# Patient Record
Sex: FEMALE | URBAN - METROPOLITAN AREA
[De-identification: names, ages, dates, MRNs, and addresses within clinical notes are randomized per-mention and may not be internally consistent; named-entity substitution may affect disease eponyms.]

---

## 2022-10-14 ENCOUNTER — APPOINTMENT (RX ONLY)
Dept: URBAN - METROPOLITAN AREA CLINIC 43 | Facility: CLINIC | Age: 62
Setting detail: DERMATOLOGY
End: 2022-10-14

## 2022-10-14 DIAGNOSIS — D17 BENIGN LIPOMATOUS NEOPLASM: ICD-10-CM

## 2022-10-14 DIAGNOSIS — L73.8 OTHER SPECIFIED FOLLICULAR DISORDERS: ICD-10-CM

## 2022-10-14 PROBLEM — D17.20 BENIGN LIPOMATOUS NEOPLASM OF SKIN AND SUBCUTANEOUS TISSUE OF UNSPECIFIED LIMB: Status: ACTIVE | Noted: 2022-10-14

## 2022-10-14 PROCEDURE — ? COUNSELING

## 2022-10-14 PROCEDURE — 99202 OFFICE O/P NEW SF 15 MIN: CPT

## 2022-10-14 PROCEDURE — ? ADDITIONAL NOTES

## 2022-10-14 ASSESSMENT — LOCATION ZONE DERM
LOCATION ZONE: LIP
LOCATION ZONE: LEG

## 2022-10-14 ASSESSMENT — LOCATION DETAILED DESCRIPTION DERM
LOCATION DETAILED: RIGHT UPPER CUTANEOUS LIP
LOCATION DETAILED: LEFT POSTERIOR THIGH

## 2022-10-14 ASSESSMENT — LOCATION SIMPLE DESCRIPTION DERM
LOCATION SIMPLE: RIGHT LIP
LOCATION SIMPLE: LEFT INNER THIGH

## 2022-10-14 NOTE — PROCEDURE: ADDITIONAL NOTES
Detail Level: Simple
Additional Notes: Lipoma vs. fibrolipoma\\n-Located on the left inner thigh \\n-Present for ~1 year \\n-Tried salicylic acid and pimple patch \\n- Never drained and no hx of trauma\\n\\n-Discussed etiology of the condition \\n-Reassured benign \\n\\n-Discussed R/B/SE of observation vs excision \\n-Pt opts to monitor \\n\\Mckenna questions answered
Render Risk Assessment In Note?: no
Additional Notes: Discussed nature of condition\\nConsider cosmetic consultation with [facility]\\nConsider microneedling vs. chemical peel\\n\\nDiscussed R/B/SE of punch removal including scarring - rec. Dr. Zhu\\nReferred to [facility] and  for cosmetic procedures

## 2022-12-01 ENCOUNTER — APPOINTMENT (RX ONLY)
Dept: URBAN - METROPOLITAN AREA CLINIC 42 | Facility: CLINIC | Age: 62
Setting detail: DERMATOLOGY
End: 2022-12-01

## 2022-12-01 DIAGNOSIS — L73.8 OTHER SPECIFIED FOLLICULAR DISORDERS: ICD-10-CM

## 2022-12-01 PROCEDURE — ? TCA CROSS

## 2022-12-01 PROCEDURE — ? COUNSELING

## 2022-12-01 PROCEDURE — ? DIAGNOSIS COMMENT

## 2022-12-01 NOTE — PROCEDURE: DIAGNOSIS COMMENT
Render Risk Assessment In Note?: no
Detail Level: Simple
Comment: Discussed the R/B/SE of TCA Cross to one spot on right upper lip\\n2-3 session\\nPaid $150

## 2022-12-01 NOTE — PROCEDURE: TCA CROSS
Post-Care Instructions: I reviewed with the patient in detail post-care instructions. Patient should avoid sun exposure and wear sun protection.
Treatment Number: 1
Prep: After the procedure, the treatment areas were washed with soap and water, and a post-peel cream was applied. Sun protection and post-care instructions were reviewed with the patient.
Tca %: 90% TCA
Consent: Prior to the procedure, written consent was obtained and risks were reviewed, including but not limited to: redness, peeling, blistering, pigmentary change, scarring, infection, and pain. Patient is aware multiple treatments may be necessary to achieve the desired outcome.
Detail Level: Simple
Prep: The treated area was degreased with pre-peel cleanser, and vaseline was applied for protection of mucous membranes.
Frost (0,1+,2+,3+,4+): 1+
Price (Use Numbers Only, No Special Characters Or $): 150